# Patient Record
Sex: FEMALE | Race: WHITE
[De-identification: names, ages, dates, MRNs, and addresses within clinical notes are randomized per-mention and may not be internally consistent; named-entity substitution may affect disease eponyms.]

---

## 2017-08-09 ENCOUNTER — APPOINTMENT (OUTPATIENT)
Dept: OBGYN | Facility: CLINIC | Age: 72
End: 2017-08-09
Payer: COMMERCIAL

## 2017-08-09 VITALS
WEIGHT: 130 LBS | SYSTOLIC BLOOD PRESSURE: 140 MMHG | BODY MASS INDEX: 25.52 KG/M2 | HEIGHT: 60 IN | DIASTOLIC BLOOD PRESSURE: 80 MMHG

## 2017-08-09 DIAGNOSIS — Z87.81 PERSONAL HISTORY OF (HEALED) TRAUMATIC FRACTURE: ICD-10-CM

## 2017-08-09 DIAGNOSIS — R82.99 OTHER ABNORMAL FINDINGS IN URINE: ICD-10-CM

## 2017-08-09 LAB
BILIRUB UR QL STRIP: NORMAL
COLLECTION METHOD: NORMAL
GLUCOSE UR-MCNC: NORMAL
HCG UR QL: 0.2 EU/DL
HEMOCCULT SP1 STL QL: NEGATIVE
HGB UR QL STRIP.AUTO: ABNORMAL
KETONES UR-MCNC: NORMAL
LEUKOCYTE ESTERASE UR QL STRIP: ABNORMAL
NITRITE UR QL STRIP: NORMAL
PH UR STRIP: 6.5
PROT UR STRIP-MCNC: NORMAL
SP GR UR STRIP: 1.01

## 2017-08-09 PROCEDURE — 99397 PER PM REEVAL EST PAT 65+ YR: CPT

## 2017-08-09 PROCEDURE — 82270 OCCULT BLOOD FECES: CPT

## 2017-08-09 PROCEDURE — 81003 URINALYSIS AUTO W/O SCOPE: CPT | Mod: QW

## 2017-08-14 LAB
BACTERIA UR CULT: NORMAL
CYTOLOGY CVX/VAG DOC THIN PREP: NORMAL

## 2018-03-09 ENCOUNTER — RX RENEWAL (OUTPATIENT)
Age: 73
End: 2018-03-09

## 2018-03-26 ENCOUNTER — RX RENEWAL (OUTPATIENT)
Age: 73
End: 2018-03-26

## 2018-08-15 ENCOUNTER — APPOINTMENT (OUTPATIENT)
Dept: OBGYN | Facility: CLINIC | Age: 73
End: 2018-08-15
Payer: COMMERCIAL

## 2018-08-15 VITALS
WEIGHT: 130 LBS | SYSTOLIC BLOOD PRESSURE: 130 MMHG | HEIGHT: 60 IN | BODY MASS INDEX: 25.52 KG/M2 | DIASTOLIC BLOOD PRESSURE: 70 MMHG

## 2018-08-15 DIAGNOSIS — R82.99 OTHER ABNORMAL FINDINGS IN URINE: ICD-10-CM

## 2018-08-15 LAB — HEMOCCULT SP1 STL QL: NEGATIVE

## 2018-08-15 PROCEDURE — 82270 OCCULT BLOOD FECES: CPT

## 2018-08-15 PROCEDURE — 99397 PER PM REEVAL EST PAT 65+ YR: CPT

## 2018-08-20 LAB — CYTOLOGY CVX/VAG DOC THIN PREP: NORMAL

## 2018-09-13 ENCOUNTER — RESULT REVIEW (OUTPATIENT)
Age: 73
End: 2018-09-13

## 2019-08-19 ENCOUNTER — APPOINTMENT (OUTPATIENT)
Dept: OBGYN | Facility: CLINIC | Age: 74
End: 2019-08-19
Payer: COMMERCIAL

## 2019-08-19 VITALS
WEIGHT: 130 LBS | SYSTOLIC BLOOD PRESSURE: 124 MMHG | HEIGHT: 61 IN | DIASTOLIC BLOOD PRESSURE: 76 MMHG | BODY MASS INDEX: 24.55 KG/M2

## 2019-08-19 DIAGNOSIS — Z01.419 ENCOUNTER FOR GYNECOLOGICAL EXAMINATION (GENERAL) (ROUTINE) W/OUT ABNORMAL FINDINGS: ICD-10-CM

## 2019-08-19 LAB
BILIRUB UR QL STRIP: NORMAL
GLUCOSE UR-MCNC: NORMAL
HCG UR QL: 0.2 EU/DL
HEMOCCULT SP1 STL QL: NEGATIVE
HGB UR QL STRIP.AUTO: ABNORMAL
KETONES UR-MCNC: NORMAL
LEUKOCYTE ESTERASE UR QL STRIP: ABNORMAL
NITRITE UR QL STRIP: NORMAL
PH UR STRIP: 7
PROT UR STRIP-MCNC: NORMAL
SP GR UR STRIP: 1.01

## 2019-08-19 PROCEDURE — 99397 PER PM REEVAL EST PAT 65+ YR: CPT

## 2019-08-19 PROCEDURE — 81003 URINALYSIS AUTO W/O SCOPE: CPT | Mod: QW

## 2019-08-19 PROCEDURE — 82270 OCCULT BLOOD FECES: CPT

## 2019-08-19 RX ORDER — CEPHALEXIN 250 MG/1
250 CAPSULE ORAL
Refills: 0 | Status: DISCONTINUED | COMMUNITY
End: 2019-08-19

## 2019-08-19 NOTE — PHYSICAL EXAM
[Alert] : alert [Awake] : awake [Soft] : soft [Vulvar Atrophy] : vulvar atrophy [Oriented x3] : oriented to person, place, and time [Normal] : uterus [Atrophy] : atrophy [No Bleeding] : there was no active vaginal bleeding [Nulliparous] : was nulliparous [Pap Obtained] : a Pap smear was performed [Uterine Adnexae] : were not tender and not enlarged [Nl Sphincter Tone] : normal sphincter tone [External Hemorrhoid] : an external hemorrhoid [Acute Distress] : no acute distress [Mass] : no breast mass [Nipple Discharge] : no nipple discharge [Axillary LAD] : no axillary lymphadenopathy [Tender] : non tender [Occult Blood] : occult blood test from digital rectal exam was negative

## 2019-08-19 NOTE — REVIEW OF SYSTEMS
[Nl] : Musculoskeletal [Sleep Disturbances] : no sleep disturbances [Depression] : no depression [Anxiety] : no anxiety

## 2019-08-19 NOTE — HISTORY OF PRESENT ILLNESS
[1 Year Ago] : 1 year ago [Good] : being in good health [Postmenopausal] : is postmenopausal [Currently In Menopause] : currently in menopause [Definite:  ___ (Date)] : the last menstrual period was [unfilled] [Monogamous] : is monogamous [Sexually Active] : is sexually active [Male ___] : [unfilled] male [Regular Exercise] : regular exercise [Last Bone Density ___] : Last bone density studies [unfilled] [Last Mammogram ___] : Last Mammogram was [unfilled] [Last Colonoscopy ___] : Last colonoscopy [unfilled] [Last Pap ___] : Last cervical pap smear was [unfilled] [Weight Concerns] : no concerns with her weight [Hot Flashes] : no hot flashes [Night Sweats] : no night sweats [Dyspareunia] : no dyspareunia [FreeTextEntry8] : RARE INTERCOURSE [Experiencing Menopausal Sxs] : not experiencing menopausal symptoms

## 2019-09-03 LAB — CYTOLOGY CVX/VAG DOC THIN PREP: ABNORMAL

## 2021-06-01 ENCOUNTER — APPOINTMENT (OUTPATIENT)
Dept: OBGYN | Facility: CLINIC | Age: 76
End: 2021-06-01
Payer: COMMERCIAL

## 2021-06-01 VITALS
BODY MASS INDEX: 24.55 KG/M2 | DIASTOLIC BLOOD PRESSURE: 72 MMHG | HEIGHT: 61 IN | TEMPERATURE: 98.3 F | WEIGHT: 130 LBS | SYSTOLIC BLOOD PRESSURE: 130 MMHG

## 2021-06-01 DIAGNOSIS — Z12.11 ENCOUNTER FOR SCREENING FOR MALIGNANT NEOPLASM OF COLON: ICD-10-CM

## 2021-06-01 PROCEDURE — 99397 PER PM REEVAL EST PAT 65+ YR: CPT

## 2021-06-01 PROCEDURE — 99072 ADDL SUPL MATRL&STAF TM PHE: CPT

## 2021-06-01 NOTE — PLAN
[FreeTextEntry1] : OSTEOPOROSIS reviewed with patient.  We discussed diet, exercise, calcium, vitamin D, smoking cessation, family history.  Fall precautions given. Discussed anti-resorptive agents, hormones and hormone agonists and expectant management.  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  NO SX TODAY.\par \par Risks and benefits of screening colonoscopy discussed with patient. Family history reviewed.  Discussed COLOGUARD DNA stool testing for cancer. Patient information released to [facility] as per patient request.\par

## 2021-06-01 NOTE — PHYSICAL EXAM
[Appropriately responsive] : appropriately responsive [Alert] : alert [No Acute Distress] : no acute distress [No Lymphadenopathy] : no lymphadenopathy [Regular Rate Rhythm] : regular rate rhythm [No Murmurs] : no murmurs [Clear to Auscultation B/L] : clear to auscultation bilaterally [Soft] : soft [Non-tender] : non-tender [Non-distended] : non-distended [No HSM] : No HSM [No Lesions] : no lesions [No Mass] : no mass [Oriented x3] : oriented x3 [Examination Of The Breasts] : a normal appearance [No Masses] : no breast masses were palpable [Vulvar Atrophy] : vulvar atrophy [Labia Majora] : normal [Labia Minora] : normal [Atrophy] : atrophy [Cervical Stenosis] : cervical stenosis [Normal] : normal [Uterine Adnexae] : non-palpable [No Tenderness] : no tenderness [Nl Sphincter Tone] : normal sphincter tone [FreeTextEntry9] : GUAIAC NEGATIVE

## 2021-06-01 NOTE — HISTORY OF PRESENT ILLNESS
[FreeTextEntry1] : REQUESTS VALACYCLOVIR REFILLS, HAS BEEN GETTING INFREQUENT OUTBREAKS, INCREASED IN PAST YEAR, POSSIBLY DUE TO STRESS.  FURLOUGHED FROM WORK.\par \par DISCUSSED PRECAUTIONS AGAINST COVID19.  DISCUSSED AND RECOMMENDED VACCINATION.\par HAS BEEN VACCINATED X 2.  \par \par TAKING DAILY NITROFURANTOIN PROPHYLAXIS FOR UTI'S, DR. WARREN.\par \par Risks and benefits of screening colonoscopy discussed with patient. Family history reviewed.  Discussed COLOGUARD DNA stool testing for cancer. Patient information released to [facility] as per patient request.\par UNSURE OF COVERAGE, WILL FIND OUT AND CALL IN. [Patient reported mammogram was normal] : Patient reported mammogram was normal [Patient reported PAP Smear was normal] : Patient reported PAP Smear was normal [Patient reported bone density results were abnormal] : Patient reported bone density results were abnormal [Mammogramdate] : 8/2020 [PapSmeardate] : 8/2019 [TextBox_31] : REQUESTS YEARLY [BoneDensityDate] : 8/2018 [TextBox_37] : STABLE OSTEOPOROSIS

## 2021-06-03 LAB — CYTOLOGY CVX/VAG DOC THIN PREP: ABNORMAL

## 2021-07-27 RX ORDER — VALACYCLOVIR 500 MG/1
500 TABLET, FILM COATED ORAL TWICE DAILY
Qty: 28 | Refills: 3 | Status: DISCONTINUED | COMMUNITY
Start: 2018-08-15 | End: 2021-07-27

## 2021-07-27 RX ORDER — VALACYCLOVIR 500 MG/1
500 TABLET, FILM COATED ORAL DAILY
Qty: 90 | Refills: 3 | Status: DISCONTINUED | COMMUNITY
Start: 2018-08-15 | End: 2021-07-27

## 2021-08-12 ENCOUNTER — NON-APPOINTMENT (OUTPATIENT)
Age: 76
End: 2021-08-12

## 2022-10-31 ENCOUNTER — NON-APPOINTMENT (OUTPATIENT)
Age: 77
End: 2022-10-31

## 2022-10-31 ENCOUNTER — APPOINTMENT (OUTPATIENT)
Dept: OBGYN | Facility: CLINIC | Age: 77
End: 2022-10-31

## 2022-10-31 VITALS
SYSTOLIC BLOOD PRESSURE: 140 MMHG | DIASTOLIC BLOOD PRESSURE: 70 MMHG | BODY MASS INDEX: 23.6 KG/M2 | WEIGHT: 125 LBS | HEIGHT: 61 IN

## 2022-10-31 DIAGNOSIS — B00.9 HERPESVIRAL INFECTION, UNSPECIFIED: ICD-10-CM

## 2022-10-31 DIAGNOSIS — N95.2 POSTMENOPAUSAL ATROPHIC VAGINITIS: ICD-10-CM

## 2022-10-31 DIAGNOSIS — Z12.39 ENCOUNTER FOR OTHER SCREENING FOR MALIGNANT NEOPLASM OF BREAST: ICD-10-CM

## 2022-10-31 PROCEDURE — 99214 OFFICE O/P EST MOD 30 MIN: CPT

## 2022-10-31 NOTE — PLAN
[FreeTextEntry1] : Patient received prescription for mammogram and breast sonogram and bone density screening. PAP smear performed during to day's visit. Patient written for prescription of Valtrex as per request. Cologuard prescription also given to patient. Follow up in one year.

## 2022-10-31 NOTE — PHYSICAL EXAM
[Chaperone Present] : A chaperone was present in the examining room during all aspects of the physical examination [Appropriately responsive] : appropriately responsive [Alert] : alert [No Acute Distress] : no acute distress [Soft] : soft [Non-tender] : non-tender [Non-distended] : non-distended [No Mass] : no mass [Oriented x3] : oriented x3 [Examination Of The Breasts] : a normal appearance [No Masses] : no breast masses were palpable [Labia Majora] : normal [Labia Minora] : normal [Atrophy] : atrophy [No Bleeding] : There was no active vaginal bleeding [Normal] : normal [Uterine Adnexae] : normal [Normal rectal exam] : was normal [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

## 2022-10-31 NOTE — HISTORY OF PRESENT ILLNESS
[Men] : men [No] : No [TextBox_4] : Annual [Mammogramdate] : 8/18/22 [TextBox_19] : br 1 [PapSmeardate] : 6/1/21 [TextBox_31] : atrophic  [BoneDensityDate] : 8/27/18 [TextBox_37] : osteoporosis  [ColonoscopyDate] : cologard [TextBox_43] : NOT DONE [Previously active] : previously active [FreeTextEntry1] : IN A "DRY SPELL," TAKES PROPHYLACTIC NITROFURANTOIN [FreeTextEntry2] :

## 2022-11-07 LAB — CYTOLOGY CVX/VAG DOC THIN PREP: ABNORMAL

## 2023-05-10 ENCOUNTER — APPOINTMENT (OUTPATIENT)
Dept: ORTHOPEDIC SURGERY | Facility: CLINIC | Age: 78
End: 2023-05-10
Payer: MEDICARE

## 2023-05-10 ENCOUNTER — RESULT REVIEW (OUTPATIENT)
Age: 78
End: 2023-05-10

## 2023-05-10 VITALS — BODY MASS INDEX: 23.6 KG/M2 | WEIGHT: 125 LBS | HEIGHT: 61 IN

## 2023-05-10 PROCEDURE — 99203 OFFICE O/P NEW LOW 30 MIN: CPT

## 2023-05-10 PROCEDURE — 73562 X-RAY EXAM OF KNEE 3: CPT | Mod: LT

## 2023-05-10 RX ORDER — DICLOFENAC SODIUM 1% 10 MG/G
1 GEL TOPICAL
Qty: 1 | Refills: 6 | Status: ACTIVE | COMMUNITY
Start: 2023-05-10 | End: 1900-01-01

## 2023-05-10 NOTE — HISTORY OF PRESENT ILLNESS
[Gradual] : gradual [4] : 4 [Dull/Aching] : dull/aching [Sharp] : sharp [Constant] : constant [Rest] : rest [Meds] : meds [Walking] : walking [Retired] : Work status: retired [de-identified] : 5/10/23:  left knee pain and leg pain. had left foot stress injury being treated. [FreeTextEntry1] : left knee [FreeTextEntry3] : 1 months ago [FreeTextEntry5] : something snapped over the back of the knee

## 2023-05-10 NOTE — PHYSICAL EXAM
[NL (0)] : extension 0 degrees [4___] : quadriceps 4[unfilled]/5 [5___] : hamstring 5[unfilled]/5 [] : light touch is intact throughout [Left] : left knee [There are no fractures, subluxations or dislocations. No significant abnormalities are seen] : There are no fractures, subluxations or dislocations. No significant abnormalities are seen [TWNoteComboBox7] : flexion 110 degrees

## 2023-05-10 NOTE — ASSESSMENT
[FreeTextEntry1] : left knee pain for several weeks. minimal oa.  possible mmt.\par \par was also diagnosed with second metatarsal stress injury in mid April.  being treated in hard soled shoe with podiatrist. improving per patient.

## 2023-05-17 ENCOUNTER — APPOINTMENT (OUTPATIENT)
Dept: ORTHOPEDIC SURGERY | Facility: CLINIC | Age: 78
End: 2023-05-17
Payer: MEDICARE

## 2023-05-17 VITALS — BODY MASS INDEX: 23.6 KG/M2 | WEIGHT: 125 LBS | HEIGHT: 61 IN

## 2023-05-17 DIAGNOSIS — Z86.79 PERSONAL HISTORY OF OTHER DISEASES OF THE CIRCULATORY SYSTEM: ICD-10-CM

## 2023-05-17 PROCEDURE — 99214 OFFICE O/P EST MOD 30 MIN: CPT

## 2023-05-17 NOTE — ASSESSMENT
[FreeTextEntry1] : left knee pain for several weeks. minimal oa.  mri shows small mmt but medial femoral condyle impaction injury without displaced fracture.  also a lot of edema medial tibial plateau.  feeling better but still has pain. unclear etiology but possibly from osteoporosis.  she is getting bone scan soon.\par \par was also diagnosed with second metatarsal stress injury in mid April.  being treated in hard soled shoe with podiatrist. improving per patient.

## 2023-05-17 NOTE — DISCUSSION/SUMMARY
[de-identified] : Progress Note completed by Domonique Mendoza PA-C\par * Dr. Levy -- The documentation recorded in this note accurately reflects the decisions made by me during this visit.

## 2023-05-17 NOTE — HISTORY OF PRESENT ILLNESS
[4] : 4 [Dull/Aching] : dull/aching [Sharp] : sharp [Retired] : Work status: retired [de-identified] : 5/10/23:  left knee pain and leg pain. had left foot stress injury being treated.\par 5/17/23: left kne pain mildly improved. [] : no [FreeTextEntry1] : LEFT KNEE [de-identified] : MRI ZP

## 2023-06-28 ENCOUNTER — APPOINTMENT (OUTPATIENT)
Dept: ORTHOPEDIC SURGERY | Facility: CLINIC | Age: 78
End: 2023-06-28
Payer: OTHER GOVERNMENT

## 2023-06-28 VITALS — HEIGHT: 61 IN | BODY MASS INDEX: 23.6 KG/M2 | WEIGHT: 125 LBS

## 2023-06-28 DIAGNOSIS — S83.8X2A SPRAIN OF OTHER SPECIFIED PARTS OF LEFT KNEE, INITIAL ENCOUNTER: ICD-10-CM

## 2023-06-28 DIAGNOSIS — S72.492D OTHER FRACTURE OF LOWER END OF LEFT FEMUR, SUBSEQUENT ENCOUNTER FOR CLOSED FRACTURE WITH ROUTINE HEALING: ICD-10-CM

## 2023-06-28 PROCEDURE — 27508 TREATMENT OF THIGH FRACTURE: CPT | Mod: LT

## 2023-06-28 PROCEDURE — 99213 OFFICE O/P EST LOW 20 MIN: CPT | Mod: 25

## 2023-06-28 NOTE — PHYSICAL EXAM
[Left] : left knee [NL (0)] : extension 0 degrees [4___] : quadriceps 4[unfilled]/5 [5___] : hamstring 5[unfilled]/5 [] : medial tibial plateau tenderness [TWNoteComboBox7] : flexion 115 degrees

## 2023-06-28 NOTE — ASSESSMENT
[FreeTextEntry1] : left knee pain for several weeks. minimal oa.  mri shows small mmt but medial femoral condyle impaction injury without displaced fracture.  also a lot of edema medial tibial plateau.  feeling better but still has pain. unclear etiology but possibly from osteoporosis.  mild improvement. \par \par was also diagnosed with second metatarsal stress injury in mid April.  being treated in hard soled shoe with podiatrist. improving per patient.

## 2023-06-28 NOTE — HISTORY OF PRESENT ILLNESS
[4] : 4 [Dull/Aching] : dull/aching [Sharp] : sharp [Retired] : Work status: retired [de-identified] : 5/10/23:  left knee pain and leg pain. had left foot stress injury being treated.\par 5/17/23: left kne pain mildly improved.\par 6/28/23: mild improvement with rest but still medial knee pain. [] : no [FreeTextEntry1] : LEFT KNEE [FreeTextEntry6] : swelling  [de-identified] : MRI ZP

## 2023-08-09 ENCOUNTER — RESULT REVIEW (OUTPATIENT)
Age: 78
End: 2023-08-09

## 2023-08-09 ENCOUNTER — APPOINTMENT (OUTPATIENT)
Dept: ORTHOPEDIC SURGERY | Facility: CLINIC | Age: 78
End: 2023-08-09
Payer: MEDICARE

## 2023-08-09 VITALS — HEIGHT: 61 IN | BODY MASS INDEX: 23.6 KG/M2 | WEIGHT: 125 LBS

## 2023-08-09 PROCEDURE — 99213 OFFICE O/P EST LOW 20 MIN: CPT | Mod: 24

## 2023-08-09 NOTE — HISTORY OF PRESENT ILLNESS
[4] : 4 [Dull/Aching] : dull/aching [Sharp] : sharp [Retired] : Work status: retired [de-identified] : 5/10/23:  left knee pain and leg pain. had left foot stress injury being treated. 5/17/23: left kne pain mildly improved. 6/28/23: mild improvement with rest but still medial knee pain. 8/9/23: mild improvement but still has some knee pain. [] : no [FreeTextEntry1] : LEFT KNEE [FreeTextEntry6] : swelling  [de-identified] : MRI ZP

## 2023-08-09 NOTE — PHYSICAL EXAM
[Left] : left knee [NL (0)] : extension 0 degrees [4___] : quadriceps 4[unfilled]/5 [5___] : hamstring 5[unfilled]/5 [] : light touch is intact throughout [TWNoteComboBox7] : flexion 110 degrees

## 2023-08-09 NOTE — ASSESSMENT
[FreeTextEntry1] : left knee pain for several weeks. minimal oa.  mri shows small mmt but medial femoral condyle impaction injury without displaced fracture.  also a lot of edema medial tibial plateau.  mild improvement but no resolved.  was also diagnosed with second metatarsal stress injury in mid April.  being treated in hard soled shoe with podiatrist. improving per patient.

## 2023-08-23 ENCOUNTER — APPOINTMENT (OUTPATIENT)
Dept: ORTHOPEDIC SURGERY | Facility: CLINIC | Age: 78
End: 2023-08-23
Payer: MEDICARE

## 2023-08-23 VITALS — BODY MASS INDEX: 23.6 KG/M2 | WEIGHT: 125 LBS | HEIGHT: 61 IN

## 2023-08-23 DIAGNOSIS — S83.242D OTHER TEAR OF MEDIAL MENISCUS, CURRENT INJURY, LEFT KNEE, SUBSEQUENT ENCOUNTER: ICD-10-CM

## 2023-08-23 DIAGNOSIS — M17.12 UNILATERAL PRIMARY OSTEOARTHRITIS, LEFT KNEE: ICD-10-CM

## 2023-08-23 DIAGNOSIS — S83.92XA SPRAIN OF UNSPECIFIED SITE OF LEFT KNEE, INITIAL ENCOUNTER: ICD-10-CM

## 2023-08-23 PROCEDURE — 99214 OFFICE O/P EST MOD 30 MIN: CPT | Mod: 24

## 2023-08-23 NOTE — PHYSICAL EXAM
[Left] : left knee [NL (0)] : extension 0 degrees [4___] : quadriceps 4[unfilled]/5 [5___] : hamstring 5[unfilled]/5 [] : light touch is intact throughout [TWNoteComboBox7] : flexion 115 degrees

## 2023-08-23 NOTE — ASSESSMENT
[FreeTextEntry1] : left knee pain for several weeks. minimal oa.  mri shows small mmt but medial femoral condyle impaction injury without displaced fracture. repeat mri 8 2023 shows root tear, oa, and bone edema with mild improvement.  still some cysitc changes in proximal tibia.  was also diagnosed with second metatarsal stress injury in mid April.  being treated in hard soled shoe with podiatrist. improving per patient.

## 2023-08-23 NOTE — HISTORY OF PRESENT ILLNESS
[6] : 6 [5] : 5 [Dull/Aching] : dull/aching [Occasional] : occasional [Rest] : rest [Meds] : meds [Walking] : walking [de-identified] : 5/10/23:  left knee pain and leg pain. had left foot stress injury being treated. 5/17/23: left kne pain mildly improved. 6/28/23: mild improvement with rest but still medial knee pain. 8/9/23: mild improvement but still has some knee pain. 8/23/23:  follow up left knee.  [FreeTextEntry1] : left knee [de-identified] : mri

## 2023-11-01 ENCOUNTER — APPOINTMENT (OUTPATIENT)
Dept: ORTHOPEDIC SURGERY | Facility: CLINIC | Age: 78
End: 2023-11-01

## 2023-11-06 ENCOUNTER — APPOINTMENT (OUTPATIENT)
Dept: OBGYN | Facility: CLINIC | Age: 78
End: 2023-11-06
Payer: MEDICARE

## 2023-11-06 VITALS
HEIGHT: 60 IN | DIASTOLIC BLOOD PRESSURE: 70 MMHG | BODY MASS INDEX: 25.52 KG/M2 | SYSTOLIC BLOOD PRESSURE: 120 MMHG | WEIGHT: 130 LBS

## 2023-11-06 DIAGNOSIS — N39.0 URINARY TRACT INFECTION, SITE NOT SPECIFIED: ICD-10-CM

## 2023-11-06 DIAGNOSIS — M81.0 AGE-RELATED OSTEOPOROSIS W/OUT CURRENT PATHOLOGICAL FRACTURE: ICD-10-CM

## 2023-11-06 DIAGNOSIS — Z87.42 PERSONAL HISTORY OF OTHER DISEASES OF THE FEMALE GENITAL TRACT: ICD-10-CM

## 2023-11-06 DIAGNOSIS — Z12.4 ENCOUNTER FOR SCREENING FOR MALIGNANT NEOPLASM OF CERVIX: ICD-10-CM

## 2023-11-06 PROCEDURE — 99214 OFFICE O/P EST MOD 30 MIN: CPT

## 2023-11-06 RX ORDER — NITROFURANTOIN MACROCRYSTALS 50 MG/1
50 CAPSULE ORAL
Qty: 90 | Refills: 3 | Status: ACTIVE | COMMUNITY
Start: 2020-11-12 | End: 1900-01-01

## 2023-11-09 LAB — CYTOLOGY CVX/VAG DOC THIN PREP: ABNORMAL

## 2024-11-21 ENCOUNTER — APPOINTMENT (OUTPATIENT)
Dept: OBGYN | Facility: CLINIC | Age: 79
End: 2024-11-21

## 2024-12-27 ENCOUNTER — NON-APPOINTMENT (OUTPATIENT)
Age: 79
End: 2024-12-27

## 2024-12-27 ENCOUNTER — APPOINTMENT (OUTPATIENT)
Dept: OBGYN | Facility: CLINIC | Age: 79
End: 2024-12-27
Payer: MEDICARE

## 2024-12-27 VITALS
WEIGHT: 135.5 LBS | SYSTOLIC BLOOD PRESSURE: 172 MMHG | HEIGHT: 60 IN | BODY MASS INDEX: 26.6 KG/M2 | DIASTOLIC BLOOD PRESSURE: 86 MMHG

## 2024-12-27 DIAGNOSIS — Z12.11 ENCOUNTER FOR SCREENING FOR MALIGNANT NEOPLASM OF COLON: ICD-10-CM

## 2024-12-27 DIAGNOSIS — Z01.419 ENCOUNTER FOR GYNECOLOGICAL EXAMINATION (GENERAL) (ROUTINE) W/OUT ABNORMAL FINDINGS: ICD-10-CM

## 2024-12-27 DIAGNOSIS — M81.0 AGE-RELATED OSTEOPOROSIS W/OUT CURRENT PATHOLOGICAL FRACTURE: ICD-10-CM

## 2024-12-27 DIAGNOSIS — Z12.39 ENCOUNTER FOR OTHER SCREENING FOR MALIGNANT NEOPLASM OF BREAST: ICD-10-CM

## 2024-12-27 DIAGNOSIS — Z12.4 ENCOUNTER FOR SCREENING FOR MALIGNANT NEOPLASM OF CERVIX: ICD-10-CM

## 2024-12-27 DIAGNOSIS — S83.242D OTHER TEAR OF MEDIAL MENISCUS, CURRENT INJURY, LEFT KNEE, SUBSEQUENT ENCOUNTER: ICD-10-CM

## 2024-12-27 DIAGNOSIS — B00.9 HERPESVIRAL INFECTION, UNSPECIFIED: ICD-10-CM

## 2024-12-27 DIAGNOSIS — Z12.31 ENCOUNTER FOR SCREENING MAMMOGRAM FOR MALIGNANT NEOPLASM OF BREAST: ICD-10-CM

## 2024-12-27 PROCEDURE — G0101: CPT

## 2024-12-27 RX ORDER — VALACYCLOVIR 500 MG/1
500 TABLET, FILM COATED ORAL
Qty: 30 | Refills: 5 | Status: ACTIVE | COMMUNITY
Start: 2024-12-27 | End: 1900-01-01

## 2024-12-29 PROBLEM — S83.242D ACUTE MEDIAL MENISCUS TEAR OF LEFT KNEE, SUBSEQUENT ENCOUNTER: Status: RESOLVED | Noted: 2023-05-17 | Resolved: 2024-12-29

## 2024-12-29 RX ORDER — CLOPIDOGREL BISULFATE 300 MG/1
TABLET, FILM COATED ORAL
Refills: 0 | Status: ACTIVE | COMMUNITY

## 2024-12-29 RX ORDER — LOSARTAN POTASSIUM 50 MG/1
50 TABLET, FILM COATED ORAL
Refills: 0 | Status: ACTIVE | COMMUNITY

## 2024-12-29 RX ORDER — ROSUVASTATIN CALCIUM 5 MG/1
TABLET, FILM COATED ORAL
Refills: 0 | Status: ACTIVE | COMMUNITY

## 2024-12-29 RX ORDER — AMLODIPINE BESYLATE 5 MG/1
5 TABLET ORAL
Refills: 0 | Status: ACTIVE | COMMUNITY

## 2025-01-03 ENCOUNTER — NON-APPOINTMENT (OUTPATIENT)
Age: 80
End: 2025-01-03

## 2025-03-19 ENCOUNTER — APPOINTMENT (OUTPATIENT)
Dept: OBGYN | Facility: CLINIC | Age: 80
End: 2025-03-19
Payer: MEDICARE

## 2025-03-19 VITALS
DIASTOLIC BLOOD PRESSURE: 78 MMHG | BODY MASS INDEX: 25.52 KG/M2 | WEIGHT: 130 LBS | HEIGHT: 60 IN | SYSTOLIC BLOOD PRESSURE: 122 MMHG

## 2025-03-19 DIAGNOSIS — R23.2 FLUSHING: ICD-10-CM

## 2025-03-19 DIAGNOSIS — Z01.419 ENCOUNTER FOR GYNECOLOGICAL EXAMINATION (GENERAL) (ROUTINE) W/OUT ABNORMAL FINDINGS: ICD-10-CM

## 2025-03-19 DIAGNOSIS — R63.5 ABNORMAL WEIGHT GAIN: ICD-10-CM

## 2025-03-19 PROCEDURE — 99214 OFFICE O/P EST MOD 30 MIN: CPT

## 2025-03-19 RX ORDER — HYDRALAZINE HYDROCHLORIDE 50 MG/1
TABLET ORAL
Refills: 0 | Status: ACTIVE | COMMUNITY

## 2025-03-19 RX ORDER — EZETIMIBE 10 MG/1
TABLET ORAL
Refills: 0 | Status: ACTIVE | COMMUNITY